# Patient Record
Sex: FEMALE | Race: WHITE | NOT HISPANIC OR LATINO | URBAN - METROPOLITAN AREA
[De-identification: names, ages, dates, MRNs, and addresses within clinical notes are randomized per-mention and may not be internally consistent; named-entity substitution may affect disease eponyms.]

---

## 2017-05-28 ENCOUNTER — EMERGENCY (EMERGENCY)
Facility: HOSPITAL | Age: 82
LOS: 1 days | Discharge: HOME | End: 2017-05-28
Attending: EMERGENCY MEDICINE | Admitting: EMERGENCY MEDICINE
Payer: MEDICARE

## 2017-05-28 VITALS
DIASTOLIC BLOOD PRESSURE: 82 MMHG | TEMPERATURE: 98 F | HEART RATE: 64 BPM | RESPIRATION RATE: 16 BRPM | OXYGEN SATURATION: 98 % | SYSTOLIC BLOOD PRESSURE: 145 MMHG

## 2017-05-28 VITALS
TEMPERATURE: 98 F | HEART RATE: 63 BPM | RESPIRATION RATE: 19 BRPM | SYSTOLIC BLOOD PRESSURE: 148 MMHG | OXYGEN SATURATION: 98 % | DIASTOLIC BLOOD PRESSURE: 90 MMHG

## 2017-05-28 DIAGNOSIS — R51 HEADACHE: ICD-10-CM

## 2017-05-28 DIAGNOSIS — S00.81XA ABRASION OF OTHER PART OF HEAD, INITIAL ENCOUNTER: ICD-10-CM

## 2017-05-28 DIAGNOSIS — Z23 ENCOUNTER FOR IMMUNIZATION: ICD-10-CM

## 2017-05-28 DIAGNOSIS — Z79.899 OTHER LONG TERM (CURRENT) DRUG THERAPY: ICD-10-CM

## 2017-05-28 DIAGNOSIS — S01.511A LACERATION WITHOUT FOREIGN BODY OF LIP, INITIAL ENCOUNTER: ICD-10-CM

## 2017-05-28 PROCEDURE — 70486 CT MAXILLOFACIAL W/O DYE: CPT | Mod: 26

## 2017-05-28 PROCEDURE — 70450 CT HEAD/BRAIN W/O DYE: CPT | Mod: 26

## 2017-05-28 PROCEDURE — 70450 CT HEAD/BRAIN W/O DYE: CPT | Mod: 26,77

## 2017-05-28 PROCEDURE — 99220: CPT

## 2017-05-28 RX ORDER — CARBIDOPA AND LEVODOPA 25; 100 MG/1; MG/1
0 TABLET ORAL
Qty: 0 | Refills: 0 | COMMUNITY

## 2017-05-28 RX ORDER — ESCITALOPRAM OXALATE 10 MG/1
0 TABLET, FILM COATED ORAL
Qty: 0 | Refills: 0 | COMMUNITY

## 2017-05-28 RX ORDER — TETANUS TOXOID, REDUCED DIPHTHERIA TOXOID AND ACELLULAR PERTUSSIS VACCINE, ADSORBED 5; 2.5; 8; 8; 2.5 [IU]/.5ML; [IU]/.5ML; UG/.5ML; UG/.5ML; UG/.5ML
0.5 SUSPENSION INTRAMUSCULAR ONCE
Qty: 0 | Refills: 0 | Status: DISCONTINUED | OUTPATIENT
Start: 2017-05-28 | End: 2017-05-28

## 2017-05-28 RX ORDER — TETANUS TOXOID, REDUCED DIPHTHERIA TOXOID AND ACELLULAR PERTUSSIS VACCINE, ADSORBED 5; 2.5; 8; 8; 2.5 [IU]/.5ML; [IU]/.5ML; UG/.5ML; UG/.5ML; UG/.5ML
0.5 SUSPENSION INTRAMUSCULAR ONCE
Qty: 0 | Refills: 0 | Status: COMPLETED | OUTPATIENT
Start: 2017-05-28 | End: 2017-05-28

## 2017-05-28 RX ADMIN — TETANUS TOXOID, REDUCED DIPHTHERIA TOXOID AND ACELLULAR PERTUSSIS VACCINE, ADSORBED 0.5 MILLILITER(S): 5; 2.5; 8; 8; 2.5 SUSPENSION INTRAMUSCULAR at 15:00

## 2017-05-28 NOTE — ED PROVIDER NOTE - NEUROLOGICAL, MLM
Alert and oriented, no focal deficits, no motor or sensory deficits. Alert and oriented, no focal deficits, no motor or sensory deficits.  Ambulatory with walker

## 2017-05-28 NOTE — ED PROVIDER NOTE - NS ED MD SCRIBE ATTENDING SCRIBE SECTIONS
HISTORY OF PRESENT ILLNESS/CONSULTATIONS/SHIFT CHANGE/RESULTS/HIV/REVIEW OF SYSTEMS/DISPOSITION/INTAKE ASSESSMENT/SCREENINGS/PAST MEDICAL/SURGICAL/SOCIAL HISTORY/PHYSICAL EXAM/PROGRESS NOTE/VITAL SIGNS( Pullset)

## 2017-05-28 NOTE — ED PROVIDER NOTE - PROGRESS NOTE DETAILS
CT brain with small < 1 cm focal hypodensity - possible traumatic hemorrhagic but no edema.  Patient without headache, dizziness, n/v.  No blood thinning medication use.  Resting comfortable.  Radiology recommending rescan in 6-12 hours.  Called NRS team and spoke with Dulce - pending recommendations NRS recommends re-scan in 6 hour from initial scan ~ 8 pm CT brain with small < 1 cm focal hypodensity - possible traumatic hemorrhagic but no edema.  Patient without headache, dizziness, n/v.  No blood thinning medication use.  Resting comfortable.  Radiology recommending rescan in 6-12 hours.  Called NRS team and spoke with Katie - pending recommendations

## 2017-05-28 NOTE — ED CDU PROVIDER NOTE - PROGRESS NOTE DETAILS
no change in symptoms or exam.  Pending repeat CT scan.  Signed out to Dr. Murdock patient's repeat ct is stable with no changes. patient given follow up instructions with her pmd and to be taken home via ambulance. ambulating with assistance and walker. and in no acute distress.

## 2017-05-28 NOTE — ED ADULT TRIAGE NOTE - CHIEF COMPLAINT QUOTE
[place] from train [place] s/p fall on escalator. Was ambulating with walker and foot of device got caught, fell face first. Presents with abrasions to face at nose and lip, states she does not take anticoagulant medications, denies LOC. Hx Parkinson's, tetanus status unk

## 2017-05-28 NOTE — ED PROVIDER NOTE - ENMT, MLM
Airway patent, Nasal mucosa clear. Mouth with normal mucosa. Throat has no vesicles, no oropharyngeal exudates and uvula is midline. Airway patent, Nasal mucosa clear. Mouth with normal mucosa. upper lip mucosal surface small laceration without active bleeding.

## 2017-05-28 NOTE — ED ADULT NURSE NOTE - OBJECTIVE STATEMENT
Pt reports she was on the escalator with her walker and foot of walker got caught, pt tripped and fell forward hitting her face on the ground. Pt denies LOC or taking any blood thinners. Pt with abrasion to chin and pain to mouth, no obvious dental injury. Pt also c/o some pain to right hip. Pt denies any other injury

## 2017-05-28 NOTE — ED PROVIDER NOTE - MEDICAL DECISION MAKING DETAILS
Mechanical fall with facial pain. no signs of significant facial trauma or injury, however based on age, head and maxillofacial CT ordered. no new gait abnormalities; at baseline pt is unsteady and uses a walker due to parkinsons.

## 2017-05-28 NOTE — ED CDU PROVIDER NOTE - DETAILS
observation services for serial neurological exams and repeat neuroimaging.  resting comfortably now.

## 2017-05-28 NOTE — ED CDU PROVIDER NOTE - MEDICAL DECISION MAKING DETAILS
Observed in the ED x 6 hours pending repeat CT brain.  NO change in symptoms or in neurological status.  Comfortable appearing.  Walks steadily with walker and has close friend next door that helps her out.

## 2017-05-28 NOTE — ED PROVIDER NOTE - OBJECTIVE STATEMENT
81 yo female with history of parkinson's disease, walks with walker presents to ED after mechanical fall on escalator, hitting nose and face today. No LOC, no extremity/back/abdominal pain, no vomiting.

## 2017-05-28 NOTE — ED PROVIDER NOTE - NEURO NEGATIVE STATEMENT, MLM
no loss of consciousness, no gait abnormality, no headache, no sensory deficits, and no weakness. no loss of consciousness, no headache, no sensory deficits, and no weakness.

## 2017-08-14 ENCOUNTER — EMERGENCY (EMERGENCY)
Facility: HOSPITAL | Age: 82
LOS: 1 days | Discharge: HOME | End: 2017-08-14
Attending: EMERGENCY MEDICINE | Admitting: EMERGENCY MEDICINE
Payer: MEDICARE

## 2017-08-14 VITALS
TEMPERATURE: 98 F | RESPIRATION RATE: 18 BRPM | SYSTOLIC BLOOD PRESSURE: 159 MMHG | OXYGEN SATURATION: 98 % | HEART RATE: 74 BPM | DIASTOLIC BLOOD PRESSURE: 93 MMHG

## 2017-08-14 VITALS
HEART RATE: 62 BPM | SYSTOLIC BLOOD PRESSURE: 178 MMHG | RESPIRATION RATE: 20 BRPM | OXYGEN SATURATION: 99 % | TEMPERATURE: 98 F | DIASTOLIC BLOOD PRESSURE: 98 MMHG

## 2017-08-14 PROCEDURE — 99284 EMERGENCY DEPT VISIT MOD MDM: CPT | Mod: 25

## 2017-08-14 PROCEDURE — 70450 CT HEAD/BRAIN W/O DYE: CPT | Mod: 26

## 2017-08-14 PROCEDURE — 93010 ELECTROCARDIOGRAM REPORT: CPT

## 2017-08-14 NOTE — ED ADULT NURSE NOTE - OBJECTIVE STATEMENT
apx 0400 woke up to use restroom, upon standing became dizzy and fell striking face on the floor (no deformity noted) and hitting left arm on the floor as well (2 nickel sized skin tears noted) n s/s of distress, awaiting MD otero

## 2017-08-14 NOTE — ED PROVIDER NOTE - SKIN, MLM
Skin normal color for race, warm, dry and intact. No evidence of rash. Skin normal color for race, warm, dry. No evidence of rash. Skin tear 1.5cm by 1.5cm and 1.5cm by 1.5cm to left dorsal forearm, no swelling.

## 2017-08-14 NOTE — ED PROVIDER NOTE - OBJECTIVE STATEMENT
82y F with h/o aneurysm, parkinson's dz, vertigo presents s/p fall after feeling dizzy today at 0400. No LOC. Unknown head trauma. Denies HA, fever, n/v. + abrasion to left arm. Pt was sent from [facility] for further eval. Last TDAP unknown.

## 2017-08-14 NOTE — ED ADULT TRIAGE NOTE - CHIEF COMPLAINT QUOTE
Pt states she has a hx of dizziness/ falls, felt dizzy this morning at 4am and fell onto her head/ forehead. Denies pain or current dizziness but was sent from urgent care because they said due to her age she should have a CT. Pt denies LOC and not on blood thinners.

## 2017-08-14 NOTE — ED PROVIDER NOTE - MEDICAL DECISION MAKING DETAILS
Plan for CT head and Vaseline gauze dressings to wound. Plan for CT head and Vaseline gauze dressings to wound. Detailed wound care instruction provided

## 2017-08-18 DIAGNOSIS — S51.812A LACERATION WITHOUT FOREIGN BODY OF LEFT FOREARM, INITIAL ENCOUNTER: ICD-10-CM

## 2017-08-18 DIAGNOSIS — Z79.899 OTHER LONG TERM (CURRENT) DRUG THERAPY: ICD-10-CM

## 2017-08-18 DIAGNOSIS — S09.90XA UNSPECIFIED INJURY OF HEAD, INITIAL ENCOUNTER: ICD-10-CM

## 2017-12-09 ENCOUNTER — EMERGENCY (EMERGENCY)
Facility: HOSPITAL | Age: 82
LOS: 1 days | Discharge: HOME | End: 2017-12-09
Attending: EMERGENCY MEDICINE | Admitting: EMERGENCY MEDICINE
Payer: MEDICARE

## 2017-12-09 VITALS
RESPIRATION RATE: 20 BRPM | TEMPERATURE: 98 F | OXYGEN SATURATION: 98 % | SYSTOLIC BLOOD PRESSURE: 145 MMHG | DIASTOLIC BLOOD PRESSURE: 90 MMHG | HEART RATE: 68 BPM

## 2017-12-09 VITALS
RESPIRATION RATE: 18 BRPM | HEART RATE: 65 BPM | SYSTOLIC BLOOD PRESSURE: 162 MMHG | DIASTOLIC BLOOD PRESSURE: 77 MMHG | OXYGEN SATURATION: 97 %

## 2017-12-09 LAB
ALBUMIN SERPL ELPH-MCNC: 3.9 G/DL — SIGNIFICANT CHANGE UP (ref 3.4–5)
ALP SERPL-CCNC: 48 U/L — SIGNIFICANT CHANGE UP (ref 40–120)
ALT FLD-CCNC: 21 U/L — SIGNIFICANT CHANGE UP (ref 12–42)
ANION GAP SERPL CALC-SCNC: 6 MMOL/L — LOW (ref 9–16)
APPEARANCE UR: CLEAR — SIGNIFICANT CHANGE UP
AST SERPL-CCNC: 28 U/L — SIGNIFICANT CHANGE UP (ref 15–37)
BACTERIA # UR AUTO: (no result) /HPF
BASOPHILS NFR BLD AUTO: 1 % — SIGNIFICANT CHANGE UP (ref 0–2)
BILIRUB SERPL-MCNC: 0.4 MG/DL — SIGNIFICANT CHANGE UP (ref 0.2–1.2)
BILIRUB UR-MCNC: NEGATIVE — SIGNIFICANT CHANGE UP
BUN SERPL-MCNC: 23 MG/DL — SIGNIFICANT CHANGE UP (ref 7–23)
CALCIUM SERPL-MCNC: 9.4 MG/DL — SIGNIFICANT CHANGE UP (ref 8.5–10.5)
CHLORIDE SERPL-SCNC: 107 MMOL/L — SIGNIFICANT CHANGE UP (ref 96–108)
CK MB BLD-MCNC: 4.1 % — HIGH
CK MB CFR SERPL CALC: 7.7 NG/ML — HIGH (ref 0.5–3.6)
CK SERPL-CCNC: 188 U/L — SIGNIFICANT CHANGE UP (ref 26–192)
CO2 SERPL-SCNC: 29 MMOL/L — SIGNIFICANT CHANGE UP (ref 22–31)
COLOR SPEC: YELLOW — SIGNIFICANT CHANGE UP
CREAT SERPL-MCNC: 0.83 MG/DL — SIGNIFICANT CHANGE UP (ref 0.5–1.3)
DIFF PNL FLD: NEGATIVE — SIGNIFICANT CHANGE UP
EOSINOPHIL NFR BLD AUTO: 1.4 % — SIGNIFICANT CHANGE UP (ref 0–6)
EPI CELLS # UR: SIGNIFICANT CHANGE UP /HPF
GLUCOSE SERPL-MCNC: 101 MG/DL — HIGH (ref 70–99)
GLUCOSE UR QL: NEGATIVE — SIGNIFICANT CHANGE UP
HCT VFR BLD CALC: 35.3 % — SIGNIFICANT CHANGE UP (ref 34.5–45)
HGB BLD-MCNC: 11.4 G/DL — LOW (ref 11.5–15.5)
HYALINE CASTS # UR AUTO: (no result) /LPF
IMM GRANULOCYTES NFR BLD AUTO: 0.2 % — SIGNIFICANT CHANGE UP (ref 0–1.5)
KETONES UR-MCNC: NEGATIVE — SIGNIFICANT CHANGE UP
LACTATE SERPL-SCNC: 0.6 MMOL/L — SIGNIFICANT CHANGE UP (ref 0.4–2)
LEUKOCYTE ESTERASE UR-ACNC: (no result)
LIDOCAIN IGE QN: 429 U/L — HIGH (ref 73–393)
LYMPHOCYTES # BLD AUTO: 25.5 % — SIGNIFICANT CHANGE UP (ref 13–44)
MCHC RBC-ENTMCNC: 29.5 PG — SIGNIFICANT CHANGE UP (ref 27–34)
MCHC RBC-ENTMCNC: 32.3 G/DL — SIGNIFICANT CHANGE UP (ref 32–36)
MCV RBC AUTO: 91.2 FL — SIGNIFICANT CHANGE UP (ref 80–100)
MONOCYTES NFR BLD AUTO: 9.5 % — SIGNIFICANT CHANGE UP (ref 2–14)
NEUTROPHILS NFR BLD AUTO: 62.4 % — SIGNIFICANT CHANGE UP (ref 43–77)
NITRITE UR-MCNC: NEGATIVE — SIGNIFICANT CHANGE UP
PH UR: 6 — SIGNIFICANT CHANGE UP (ref 5–8)
PLATELET # BLD AUTO: 313 K/UL — SIGNIFICANT CHANGE UP (ref 150–400)
POTASSIUM SERPL-MCNC: 3.6 MMOL/L — SIGNIFICANT CHANGE UP (ref 3.5–5.3)
POTASSIUM SERPL-SCNC: 3.6 MMOL/L — SIGNIFICANT CHANGE UP (ref 3.5–5.3)
PROT SERPL-MCNC: 7.1 G/DL — SIGNIFICANT CHANGE UP (ref 6.4–8.2)
PROT UR-MCNC: 30 MG/DL
RBC # BLD: 3.87 M/UL — SIGNIFICANT CHANGE UP (ref 3.8–5.2)
RBC # FLD: 13.4 % — SIGNIFICANT CHANGE UP (ref 10.3–16.9)
RBC CASTS # UR COMP ASSIST: < 5 /HPF — SIGNIFICANT CHANGE UP
SODIUM SERPL-SCNC: 142 MMOL/L — SIGNIFICANT CHANGE UP (ref 132–145)
SP GR SPEC: 1.02 — SIGNIFICANT CHANGE UP (ref 1–1.03)
TROPONIN I SERPL-MCNC: <0.017 NG/ML — LOW (ref 0.02–0.06)
UROBILINOGEN FLD QL: 0.2 E.U./DL — SIGNIFICANT CHANGE UP
WBC # BLD: 5.1 K/UL — SIGNIFICANT CHANGE UP (ref 3.8–10.5)
WBC # FLD AUTO: 5.1 K/UL — SIGNIFICANT CHANGE UP (ref 3.8–10.5)
WBC UR QL: > 10 /HPF

## 2017-12-09 PROCEDURE — 71010: CPT | Mod: 26

## 2017-12-09 PROCEDURE — 99285 EMERGENCY DEPT VISIT HI MDM: CPT

## 2017-12-09 PROCEDURE — 70450 CT HEAD/BRAIN W/O DYE: CPT | Mod: 26

## 2017-12-09 RX ORDER — CEFTRIAXONE 500 MG/1
1 INJECTION, POWDER, FOR SOLUTION INTRAMUSCULAR; INTRAVENOUS ONCE
Qty: 0 | Refills: 0 | Status: COMPLETED | OUTPATIENT
Start: 2017-12-09 | End: 2017-12-09

## 2017-12-09 RX ORDER — SODIUM CHLORIDE 9 MG/ML
1000 INJECTION INTRAMUSCULAR; INTRAVENOUS; SUBCUTANEOUS ONCE
Qty: 0 | Refills: 0 | Status: COMPLETED | OUTPATIENT
Start: 2017-12-09 | End: 2017-12-09

## 2017-12-09 RX ADMIN — SODIUM CHLORIDE 1000 MILLILITER(S): 9 INJECTION INTRAMUSCULAR; INTRAVENOUS; SUBCUTANEOUS at 23:09

## 2017-12-09 RX ADMIN — CEFTRIAXONE 100 GRAM(S): 500 INJECTION, POWDER, FOR SOLUTION INTRAMUSCULAR; INTRAVENOUS at 23:09

## 2017-12-09 NOTE — ED PROVIDER NOTE - PROGRESS NOTE DETAILS
feels better after fluids, IV abx dose for possibility of UTI. Pt had normal labs, EKG, CXR and HCT. UA suggestive of UTI, gave one dose of IV abx. Planned to keep pt until AM to speak to family and discuss need for further care at home needed for pt but pt decided to leave ED since she was feeling better and was able to walk on her own without assistance. Paper script for ceftin provided during downtime.

## 2017-12-09 NOTE — ED ADULT TRIAGE NOTE - CHIEF COMPLAINT QUOTE
BIBA with complaints of left leg weakness. Patient is alert and oriented x 3, was ambulatory on scene. Picked up from home, private residence. Patient has hx parkinson's on rytary ER 36.25, and htn.

## 2017-12-09 NOTE — ED PROVIDER NOTE - OBJECTIVE STATEMENT
83 y/o female pt, hx of parkinsons and HTN, scoliosis, called 911 after having worsening trouble with getting up from her chair. Pt states that she has been having worsening trouble with getting up lately and when she finally gets up she has to walk very cautiously since she feels unsteady. Also states she feels confused at times. Denies chest pain, palpitations, HA, neck pain, abd pain, nausea, vomiting, fever, chills. Weakness more so on lower extremities. Lives alone, closest family member in [place].

## 2017-12-09 NOTE — ED PROVIDER NOTE - CARE PLAN
Principal Discharge DX:	Weakness Principal Discharge DX:	Weakness  Secondary Diagnosis:	UTI (urinary tract infection)

## 2017-12-09 NOTE — ED PROVIDER NOTE - CONSTITUTIONAL, MLM
normal... chronically ill appearing, well nourished, awake, alert, oriented to person, place, time/situation and in no apparent distress.

## 2017-12-09 NOTE — ED PROVIDER NOTE - MEDICAL DECISION MAKING DETAILS
elderly pt w non specific weakness, seems that her functional status has been declining. plan: work up including labs, look for signs of infection, lyte imbalances, HCT, CXR, and observe

## 2017-12-11 LAB
CULTURE RESULTS: SIGNIFICANT CHANGE UP
SPECIMEN SOURCE: SIGNIFICANT CHANGE UP

## 2017-12-13 DIAGNOSIS — R53.1 WEAKNESS: ICD-10-CM

## 2017-12-13 DIAGNOSIS — I10 ESSENTIAL (PRIMARY) HYPERTENSION: ICD-10-CM

## 2017-12-13 DIAGNOSIS — N39.0 URINARY TRACT INFECTION, SITE NOT SPECIFIED: ICD-10-CM

## 2017-12-13 DIAGNOSIS — Z79.899 OTHER LONG TERM (CURRENT) DRUG THERAPY: ICD-10-CM

## 2017-12-13 LAB
-  CANDIDA ALBICANS: SIGNIFICANT CHANGE UP
-  CANDIDA GLABRATA: SIGNIFICANT CHANGE UP
-  CANDIDA KRUSEI: SIGNIFICANT CHANGE UP
-  CANDIDA PARAPSILOSIS: SIGNIFICANT CHANGE UP
-  CANDIDA TROPICALIS: SIGNIFICANT CHANGE UP
-  COAGULASE NEGATIVE STAPHYLOCOCCUS: SIGNIFICANT CHANGE UP
-  K. PNEUMONIAE GROUP: SIGNIFICANT CHANGE UP
-  KPC RESISTANCE GENE: SIGNIFICANT CHANGE UP
-  STREPTOCOCCUS SP. (NOT GRP A, B OR S PNEUMONIAE): SIGNIFICANT CHANGE UP
A BAUMANNII DNA SPEC QL NAA+PROBE: SIGNIFICANT CHANGE UP
E CLOAC COMP DNA BLD POS QL NAA+PROBE: SIGNIFICANT CHANGE UP
E COLI DNA BLD POS QL NAA+NON-PROBE: SIGNIFICANT CHANGE UP
ENTEROCOC DNA BLD POS QL NAA+NON-PROBE: SIGNIFICANT CHANGE UP
ENTEROCOC DNA BLD POS QL NAA+NON-PROBE: SIGNIFICANT CHANGE UP
GP B STREP DNA BLD POS QL NAA+NON-PROBE: SIGNIFICANT CHANGE UP
GRAM STN FLD: SIGNIFICANT CHANGE UP
HAEM INFLU DNA BLD POS QL NAA+NON-PROBE: SIGNIFICANT CHANGE UP
K OXYTOCA DNA BLD POS QL NAA+NON-PROBE: SIGNIFICANT CHANGE UP
L MONOCYTOG DNA BLD POS QL NAA+NON-PROBE: SIGNIFICANT CHANGE UP
METHOD TYPE: SIGNIFICANT CHANGE UP
MRSA SPEC QL CULT: SIGNIFICANT CHANGE UP
MSSA DNA SPEC QL NAA+PROBE: SIGNIFICANT CHANGE UP
N MEN ISLT CULT: SIGNIFICANT CHANGE UP
P AERUGINOSA DNA BLD POS NAA+NON-PROBE: SIGNIFICANT CHANGE UP
PROTEUS SP DNA BLD POS QL NAA+NON-PROBE: SIGNIFICANT CHANGE UP
S MARCESCENS DNA BLD POS NAA+NON-PROBE: SIGNIFICANT CHANGE UP
S PNEUM DNA BLD POS QL NAA+NON-PROBE: SIGNIFICANT CHANGE UP
S PYO DNA BLD POS QL NAA+NON-PROBE: SIGNIFICANT CHANGE UP

## 2017-12-14 LAB
CULTURE RESULTS: SIGNIFICANT CHANGE UP
ORGANISM # SPEC MICROSCOPIC CNT: SIGNIFICANT CHANGE UP
ORGANISM # SPEC MICROSCOPIC CNT: SIGNIFICANT CHANGE UP
SPECIMEN SOURCE: SIGNIFICANT CHANGE UP

## 2017-12-15 LAB
CULTURE RESULTS: SIGNIFICANT CHANGE UP
SPECIMEN SOURCE: SIGNIFICANT CHANGE UP

## 2018-03-01 ENCOUNTER — EMERGENCY (EMERGENCY)
Facility: HOSPITAL | Age: 83
LOS: 1 days | Discharge: HOME | End: 2018-03-01
Admitting: EMERGENCY MEDICINE
Payer: MEDICARE

## 2018-03-01 VITALS
DIASTOLIC BLOOD PRESSURE: 103 MMHG | RESPIRATION RATE: 16 BRPM | HEART RATE: 73 BPM | OXYGEN SATURATION: 99 % | SYSTOLIC BLOOD PRESSURE: 170 MMHG | TEMPERATURE: 98 F

## 2018-03-01 VITALS
HEART RATE: 68 BPM | RESPIRATION RATE: 16 BRPM | DIASTOLIC BLOOD PRESSURE: 92 MMHG | OXYGEN SATURATION: 99 % | SYSTOLIC BLOOD PRESSURE: 161 MMHG | TEMPERATURE: 99 F

## 2018-03-01 DIAGNOSIS — I11.0 HYPERTENSIVE HEART DISEASE WITH HEART FAILURE: ICD-10-CM

## 2018-03-01 DIAGNOSIS — Z79.899 OTHER LONG TERM (CURRENT) DRUG THERAPY: ICD-10-CM

## 2018-03-01 DIAGNOSIS — R10.9 UNSPECIFIED ABDOMINAL PAIN: ICD-10-CM

## 2018-03-01 DIAGNOSIS — K59.00 CONSTIPATION, UNSPECIFIED: ICD-10-CM

## 2018-03-01 PROCEDURE — 99283 EMERGENCY DEPT VISIT LOW MDM: CPT

## 2018-03-01 RX ORDER — MULTIVIT WITH MIN/MFOLATE/K2 340-15/3 G
350 POWDER (GRAM) ORAL ONCE
Qty: 0 | Refills: 0 | Status: COMPLETED | OUTPATIENT
Start: 2018-03-01 | End: 2018-03-01

## 2018-03-01 RX ADMIN — Medication 350 MILLILITER(S): at 19:42

## 2018-03-01 NOTE — ED PROVIDER NOTE - MEDICAL DECISION MAKING DETAILS
pt with constipation, no hard stool impaction.  only small BM with enema in ED, and I offered to give mag citrate in ed however pt would like to take this home and drink at home.  pt instructed to return with any abdominal pain, fevers, vomiting or increased/worrisome symptoms

## 2018-03-01 NOTE — ED PROVIDER NOTE - OBJECTIVE STATEMENT
81 y/o F with h/o Parkinson's, aneurysm, mild HTN, presents with constipation for 1 week. She denies any h/o abd surgery or h/o SVO. She presented to her PMD yesterday and reports she did small disimpaction but no other stool/BMs afterwards. Denies any abd pain, nausea, vomiting.

## 2018-03-01 NOTE — ED PROVIDER NOTE - GENITOURINARY, MLM
No impacted stool in the vault. No hard stool on digital rectal exam. However, soft, brown stool noted in the vault.

## 2018-03-01 NOTE — ED PROVIDER NOTE - CONSTITUTIONAL, MLM
normal... frail but well appearing, well nourished, awake, alert, oriented to person, place, time/situation and in no apparent distress.

## 2018-04-09 ENCOUNTER — EMERGENCY (EMERGENCY)
Facility: HOSPITAL | Age: 83
LOS: 1 days | Discharge: HOME | End: 2018-04-09
Attending: EMERGENCY MEDICINE | Admitting: EMERGENCY MEDICINE
Payer: MEDICARE

## 2018-04-09 VITALS
RESPIRATION RATE: 18 BRPM | TEMPERATURE: 98 F | DIASTOLIC BLOOD PRESSURE: 123 MMHG | HEART RATE: 71 BPM | OXYGEN SATURATION: 100 % | SYSTOLIC BLOOD PRESSURE: 182 MMHG

## 2018-04-09 DIAGNOSIS — Z79.899 OTHER LONG TERM (CURRENT) DRUG THERAPY: ICD-10-CM

## 2018-04-09 DIAGNOSIS — R55 SYNCOPE AND COLLAPSE: ICD-10-CM

## 2018-04-09 DIAGNOSIS — Z87.891 PERSONAL HISTORY OF NICOTINE DEPENDENCE: ICD-10-CM

## 2018-04-09 DIAGNOSIS — I10 ESSENTIAL (PRIMARY) HYPERTENSION: ICD-10-CM

## 2018-04-09 DIAGNOSIS — R07.89 OTHER CHEST PAIN: ICD-10-CM

## 2018-04-09 DIAGNOSIS — R07.9 CHEST PAIN, UNSPECIFIED: ICD-10-CM

## 2018-04-09 LAB
ALBUMIN SERPL ELPH-MCNC: 3.4 G/DL — SIGNIFICANT CHANGE UP (ref 3.4–5)
ALP SERPL-CCNC: 50 U/L — SIGNIFICANT CHANGE UP (ref 40–120)
ALT FLD-CCNC: 32 U/L — SIGNIFICANT CHANGE UP (ref 12–42)
ANION GAP SERPL CALC-SCNC: 11 MMOL/L — SIGNIFICANT CHANGE UP (ref 9–16)
ANION GAP SERPL CALC-SCNC: 6 MMOL/L — LOW (ref 9–16)
APTT BLD: 16.6 SEC — LOW (ref 27.5–36.5)
AST SERPL-CCNC: SIGNIFICANT CHANGE UP U/L (ref 15–37)
BASOPHILS NFR BLD AUTO: 1 % — SIGNIFICANT CHANGE UP (ref 0–2)
BILIRUB SERPL-MCNC: 0.5 MG/DL — SIGNIFICANT CHANGE UP (ref 0.2–1.2)
BUN SERPL-MCNC: 23 MG/DL — SIGNIFICANT CHANGE UP (ref 7–23)
BUN SERPL-MCNC: 29 MG/DL — HIGH (ref 7–23)
CALCIUM SERPL-MCNC: 10.2 MG/DL — SIGNIFICANT CHANGE UP (ref 8.5–10.5)
CALCIUM SERPL-MCNC: 9.6 MG/DL — SIGNIFICANT CHANGE UP (ref 8.5–10.5)
CHLORIDE SERPL-SCNC: 105 MMOL/L — SIGNIFICANT CHANGE UP (ref 96–108)
CHLORIDE SERPL-SCNC: 105 MMOL/L — SIGNIFICANT CHANGE UP (ref 96–108)
CK MB BLD-MCNC: 1.27 % — SIGNIFICANT CHANGE UP
CK MB CFR SERPL CALC: 2 NG/ML — SIGNIFICANT CHANGE UP (ref 0.5–3.6)
CK SERPL-CCNC: 158 U/L — SIGNIFICANT CHANGE UP (ref 26–192)
CO2 SERPL-SCNC: 26 MMOL/L — SIGNIFICANT CHANGE UP (ref 22–31)
CO2 SERPL-SCNC: 26 MMOL/L — SIGNIFICANT CHANGE UP (ref 22–31)
CREAT SERPL-MCNC: 0.63 MG/DL — SIGNIFICANT CHANGE UP (ref 0.5–1.3)
CREAT SERPL-MCNC: 0.73 MG/DL — SIGNIFICANT CHANGE UP (ref 0.5–1.3)
EOSINOPHIL NFR BLD AUTO: 0.7 % — SIGNIFICANT CHANGE UP (ref 0–6)
GLUCOSE SERPL-MCNC: 112 MG/DL — HIGH (ref 70–99)
GLUCOSE SERPL-MCNC: 120 MG/DL — HIGH (ref 70–99)
HCT VFR BLD CALC: 37.1 % — SIGNIFICANT CHANGE UP (ref 34.5–45)
HCT VFR BLD CALC: 38.6 % — SIGNIFICANT CHANGE UP (ref 34.5–45)
HGB BLD-MCNC: 11.8 G/DL — SIGNIFICANT CHANGE UP (ref 11.5–15.5)
HGB BLD-MCNC: 12.8 G/DL — SIGNIFICANT CHANGE UP (ref 11.5–15.5)
IMM GRANULOCYTES NFR BLD AUTO: 0.3 % — SIGNIFICANT CHANGE UP (ref 0–1.5)
INR BLD: 0.94 — SIGNIFICANT CHANGE UP (ref 0.88–1.16)
LIDOCAIN IGE QN: 166 U/L — SIGNIFICANT CHANGE UP (ref 73–393)
LYMPHOCYTES # BLD AUTO: 17.2 % — SIGNIFICANT CHANGE UP (ref 13–44)
MCHC RBC-ENTMCNC: 30 PG — SIGNIFICANT CHANGE UP (ref 27–34)
MCHC RBC-ENTMCNC: 30.6 PG — SIGNIFICANT CHANGE UP (ref 27–34)
MCHC RBC-ENTMCNC: 31.8 G/DL — LOW (ref 32–36)
MCHC RBC-ENTMCNC: 33.2 G/DL — SIGNIFICANT CHANGE UP (ref 32–36)
MCV RBC AUTO: 92.3 FL — SIGNIFICANT CHANGE UP (ref 80–100)
MCV RBC AUTO: 94.4 FL — SIGNIFICANT CHANGE UP (ref 80–100)
MONOCYTES NFR BLD AUTO: 8.2 % — SIGNIFICANT CHANGE UP (ref 2–14)
NEUTROPHILS NFR BLD AUTO: 72.6 % — SIGNIFICANT CHANGE UP (ref 43–77)
NT-PROBNP SERPL-SCNC: 102 PG/ML — SIGNIFICANT CHANGE UP
PLATELET # BLD AUTO: 322 K/UL — SIGNIFICANT CHANGE UP (ref 150–400)
PLATELET # BLD AUTO: 60 K/UL — LOW (ref 150–400)
POTASSIUM SERPL-MCNC: 4 MMOL/L — SIGNIFICANT CHANGE UP (ref 3.5–5.3)
POTASSIUM SERPL-MCNC: 6 MMOL/L — HIGH (ref 3.5–5.3)
POTASSIUM SERPL-SCNC: 4 MMOL/L — SIGNIFICANT CHANGE UP (ref 3.5–5.3)
POTASSIUM SERPL-SCNC: 6 MMOL/L — HIGH (ref 3.5–5.3)
PROT SERPL-MCNC: 7.1 G/DL — SIGNIFICANT CHANGE UP (ref 6.4–8.2)
PROTHROM AB SERPL-ACNC: 10.3 SEC — SIGNIFICANT CHANGE UP (ref 9.8–12.7)
RBC # BLD: 3.93 M/UL — SIGNIFICANT CHANGE UP (ref 3.8–5.2)
RBC # BLD: 4.18 M/UL — SIGNIFICANT CHANGE UP (ref 3.8–5.2)
RBC # FLD: 13.4 % — SIGNIFICANT CHANGE UP (ref 10.3–16.9)
RBC # FLD: 13.5 % — SIGNIFICANT CHANGE UP (ref 10.3–16.9)
SODIUM SERPL-SCNC: 137 MMOL/L — SIGNIFICANT CHANGE UP (ref 132–145)
SODIUM SERPL-SCNC: 142 MMOL/L — SIGNIFICANT CHANGE UP (ref 132–145)
TROPONIN I SERPL-MCNC: <0.017 NG/ML — LOW (ref 0.02–0.06)
TROPONIN I SERPL-MCNC: <0.017 NG/ML — LOW (ref 0.02–0.06)
WBC # BLD: 5.9 K/UL — SIGNIFICANT CHANGE UP (ref 3.8–10.5)
WBC # BLD: 6 K/UL — SIGNIFICANT CHANGE UP (ref 3.8–10.5)
WBC # FLD AUTO: 5.9 K/UL — SIGNIFICANT CHANGE UP (ref 3.8–10.5)
WBC # FLD AUTO: 6 K/UL — SIGNIFICANT CHANGE UP (ref 3.8–10.5)

## 2018-04-09 PROCEDURE — 93010 ELECTROCARDIOGRAM REPORT: CPT | Mod: 76

## 2018-04-09 PROCEDURE — 99218: CPT | Mod: 25

## 2018-04-09 PROCEDURE — 71045 X-RAY EXAM CHEST 1 VIEW: CPT | Mod: 26

## 2018-04-09 RX ADMIN — Medication 5 MILLIGRAM(S): at 22:55

## 2018-04-09 NOTE — ED CDU PROVIDER INITIAL DAY NOTE - PROGRESS NOTE DETAILS
Improved status, normal 2 trops and EKG, spoke to pt's sister Brenda and states that pt's night HHA is not available to be at home tonight with her Will delay DC until AM.

## 2018-04-09 NOTE — ED CDU PROVIDER SUBSEQUENT DAY NOTE - PROGRESS NOTE DETAILS
Normal work up, will hold til am for safe discharge. patient ambulated in ED, tolerating POs, well appearing, with no medical complaints. awaiting to call sister rehan for discharge arrangements this morning. Spoke to Henna, breanne , who to rich who is day hha, contact info  She will come to [facility] this morning. she was with patient in the evening, and is aware she is being discharged. She will likely take a cab home with patient.

## 2018-04-09 NOTE — ED PROVIDER NOTE - DIAGNOSTIC INTERPRETATION
Interpreted by ED physician  Chest x-ray, 1 views  Lungs clear, heart shadow normal, bony structures normal, no free air under diaphragm, no PTX

## 2018-04-09 NOTE — ED PROVIDER NOTE - CHPI ED SYMPTOMS NEG
no chills/no abdominal pain/no nausea/no shortness of breath/no diaphoresis/no fever/no vomiting/no cough

## 2018-04-09 NOTE — ED CDU PROVIDER INITIAL DAY NOTE - DETAILS
Pt w episode of chest pain, already resolved, CXR normal, first troponin normal. Will Obs to assess for development of new symptoms, and for repeat trop/EKG

## 2018-04-09 NOTE — ED ADULT TRIAGE NOTE - CHIEF COMPLAINT QUOTE
Patient comes in for chest discomfort for 1-45mins. was given Aspirin 162mg via EMS, which relieved the pain. patient breathing comfortable. and denies active chest pain. states feels better. Patient also accompanied by health aid.

## 2018-04-09 NOTE — ED CDU PROVIDER SUBSEQUENT DAY NOTE - MEDICAL DECISION MAKING DETAILS
Chest pain work up done In ED, normal 2 trops, normal 2 ekgs. Will hold til am for safe discharge since HHA gets home in AM.

## 2018-04-09 NOTE — ED PROVIDER NOTE - MEDICAL DECISION MAKING DETAILS
R/o MI. Blood work, CXR, cardiac monitor R/o ACS due to chest pain. EKG nonischemic at this time. Labs, CXR, cardiac monitor ordered.

## 2018-04-09 NOTE — ED PROVIDER NOTE - OBJECTIVE STATEMENT
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 CP is diffuse, radiating up right side of neck to right ear with associated near syncope. Pain began 40 minutes PTA and was pressure-like in quality. She was sitting in a chair at home when symptoms began. Pt walked to a nursing unit at her apartment complex when symptoms began and EMS was called Pt given 162mg ASA by EMS in field. Symptoms subsided after 30 minutes. Prior to onset of symptoms, she had physical therapy. No CP or SOB at the time, but notes feeling "unwell". Pt is a former smoker. No hx of MI, asthma, or COPD. Denies f/c, cough, SOB, diaphoresis, abd pain, n/v/d, urinary sx.

## 2018-04-10 VITALS — SYSTOLIC BLOOD PRESSURE: 129 MMHG | DIASTOLIC BLOOD PRESSURE: 80 MMHG | HEART RATE: 65 BPM

## 2018-04-10 PROCEDURE — 99217: CPT

## 2018-04-10 NOTE — ED CDU PROVIDER DISPOSITION NOTE - CLINICAL COURSE
Please see initial provider notes.
Pt S/P chest pain episode, resolved, EKG non ischemic x2, normal CXR, pt in no distress during ED visit. States she has had similar episodes in the past w normal work ups.

## 2018-04-10 NOTE — ED ADULT NURSE REASSESSMENT NOTE - NS ED NURSE REASSESS COMMENT FT1
patient having breakfast
patient having breakfast.
pt resting in bed. Diaper and sheets changed. Pt advised that she will spend the night. Enalapril given as prescribed.
patient walking around in the ER with walker. Awaiting for health aid. Feeling better and ready to be discharged.
received pt. from [facility]. pt. resting on hospital stretcher, no complaints. breakfast will be ordered for patient

## 2019-02-05 NOTE — ED PROVIDER NOTE - CPE EDP NEURO NORM
----- Message from Pedro Pablo Chavez MD sent at 1/14/2019  7:52 AM EST -----  Please call pt and schedule her to come in for ov prior to next refill on medications. normal...

## 2019-03-26 ENCOUNTER — EMERGENCY (EMERGENCY)
Facility: HOSPITAL | Age: 84
LOS: 1 days | Discharge: HOME | End: 2019-03-26
Attending: EMERGENCY MEDICINE | Admitting: EMERGENCY MEDICINE
Payer: MEDICARE

## 2019-03-26 VITALS
SYSTOLIC BLOOD PRESSURE: 144 MMHG | OXYGEN SATURATION: 97 % | DIASTOLIC BLOOD PRESSURE: 91 MMHG | HEART RATE: 80 BPM | RESPIRATION RATE: 16 BRPM | TEMPERATURE: 98 F

## 2019-03-26 VITALS
TEMPERATURE: 98 F | SYSTOLIC BLOOD PRESSURE: 181 MMHG | OXYGEN SATURATION: 97 % | RESPIRATION RATE: 16 BRPM | DIASTOLIC BLOOD PRESSURE: 99 MMHG | HEART RATE: 73 BPM

## 2019-03-26 DIAGNOSIS — R53.1 WEAKNESS: ICD-10-CM

## 2019-03-26 DIAGNOSIS — Z79.899 OTHER LONG TERM (CURRENT) DRUG THERAPY: ICD-10-CM

## 2019-03-26 DIAGNOSIS — G20 PARKINSON'S DISEASE: ICD-10-CM

## 2019-03-26 PROCEDURE — 99284 EMERGENCY DEPT VISIT MOD MDM: CPT | Mod: 25

## 2019-03-26 PROCEDURE — 93010 ELECTROCARDIOGRAM REPORT: CPT

## 2019-03-26 NOTE — ED PROVIDER NOTE - OBJECTIVE STATEMENT
84 yo woman w a hisotry of parkinson's at home today feeling stiff unable to get up from chair called 911 as a means to help her get out up form the chair. pt has made a similar call to 911 for the same issue of needing to get out of chair in past. shaunna riley - who knows patient for 15 months present in the ER state that patient appear well at her base line. pt does not want testing done in ER "I just want ot go home and take a shower.     pt denies ha cp sob abd pain new weakness or sensory changes. no fever no n/v/d/

## 2019-03-26 NOTE — ED ADULT NURSE NOTE - CHIEF COMPLAINT QUOTE
Increased weakness today, patient unable to come out of bed today, h/o parkinson's, also reports falling today, mechanism unknown. AxOx2

## 2019-03-26 NOTE — ED ADULT TRIAGE NOTE - CHIEF COMPLAINT QUOTE
Increased weakness today, patient unable to come out of bed today, h/o parkinson's, also reports falling today, mechanism unknown Increased weakness today, patient unable to come out of bed today, h/o parkinson's, also reports falling today, mechanism unknown. AxOx2

## 2019-03-26 NOTE — ED ADULT NURSE NOTE - NSIMPLEMENTINTERV_GEN_ALL_ED
Implemented All Universal Safety Interventions:  [place] to call system. Call bell, personal items and telephone within reach. Instruct patient to call for assistance. Room bathroom lighting operational. Non-slip footwear when patient is off stretcher. Physically safe environment: no spills, clutter or unnecessary equipment. Stretcher in lowest position, wheels locked, appropriate side rails in place.

## 2019-03-26 NOTE — ED PROVIDER NOTE - NSFOLLOWUPINSTRUCTIONS_ED_ALL_ED_FT
1)  PLEASE FOLLOW-UP WITH YOUR PRIMARY CARE DOCTOR as benny     2)  BRING ALL PAPERWORK FROM TODAY'S EMERGENCY ROOM  VISIT TO YOUR FOLLOW-UP VISIT.  IF YOU DO NOT HAVE A PRIMARY CARE DOCTOR PLEASE REFER TO THE OFFICE/CLINIC INFORMATION GIVEN ABOVE.  YOU MAY ALSO CALL [phone] AND ASK FOR MS. ANTHONY BEINTEZ.  SHE CAN HELP YOU MAKE A FOLLOW-UP APPOINTMENT.  HER HOURS ARE 11AM-7PM MONDAY - FRIDAY.    3) PLEASE RETURN TO THE ER IMMEDIATELY OR CALL 911 FOR ANY HIGH FEVER, TROUBLE BREATHING, CHEST PAIN, WEAKNESS, VOMITING, SEVERE PAIN, OR ANY OTHER CONCERNS.

## 2019-03-26 NOTE — ED PROVIDER NOTE - CONSTITUTIONAL, MLM
normal... Well appearing, well nourished, awake, alert, oriented to person, place, time/situation and in no apparent distress. smiling

## 2019-03-26 NOTE — ED PROVIDER NOTE - NEUROLOGICAL, MLM
Alert and oriented, no focal deficits, speech is fluent and appropriate cooperative  pt is able to get up and out of bed w/ minimal assistance and ambulate holding hand without difficulty

## 2019-03-26 NOTE — ED PROVIDER NOTE - CLINICAL SUMMARY MEDICAL DECISION MAKING FREE TEXT BOX
Strict prompt return precautions to the Emergency Room discussed for any worsening or new symptoms. The patient verbalized understanding of these precautions and need to return. The patient tolerated PO fluids.  At the time of discharge from the Emergency Department, the patient is alert with fluent appropriate speech and ambulatory with assistance that is normal for patient.  difficulty.  A medical screening examination was performed and no emergency medical condition was identified.    dc home with shaunna riley     I have discussed the discharge plan with the patient. The patient agrees with the plan, as discussed.  The patient understands Emergency Department diagnosis is a preliminary diagnosis often based on limited information and that the patient must adhere to the follow-up plan as discussed.  The patient understands that if the symptoms worsen or if prescribed medications do not have the desired/planned effect that the patient may return to the Emergency Department at any time for further evaluation and treatment.

## 2019-03-27 PROBLEM — I72.9 ANEURYSM OF UNSPECIFIED SITE: Chronic | Status: ACTIVE | Noted: 2017-08-14

## 2019-03-27 PROBLEM — I10 ESSENTIAL (PRIMARY) HYPERTENSION: Chronic | Status: ACTIVE | Noted: 2018-03-01

## 2019-03-27 PROBLEM — G20 PARKINSON'S DISEASE: Chronic | Status: ACTIVE | Noted: 2017-05-28

## 2021-01-01 NOTE — ED PROVIDER NOTE - PROGRESS NOTE DETAILS
no normal work up so far, will obs for repeat EKG, troponin. Brenda ([facility]) [phone].

## 2022-09-21 NOTE — ED PROVIDER NOTE - DATA REVIEWED, MDM
Implemented All Fall Risk Interventions:  Brothers to call system. Call bell, personal items and telephone within reach. Instruct patient to call for assistance. Room bathroom lighting operational. Non-slip footwear when patient is off stretcher. Physically safe environment: no spills, clutter or unnecessary equipment. Stretcher in lowest position, wheels locked, appropriate side rails in place. Provide visual cue, wrist band, yellow gown, etc. Monitor gait and stability. Monitor for mental status changes and reorient to person, place, and time. Review medications for side effects contributing to fall risk. Reinforce activity limits and safety measures with patient and family.
vital signs/nurses notes

## 2024-02-15 NOTE — ED POST DISCHARGE NOTE - NEED FOR PATIENT CONTACT
No, appropriate care received in ED patient critically ill requiring medications with intensive monitoring, discussion with consultants, discussion with patient and family, interpretation of diagnostic studies.  I, Yakelin Branch, have personally seen and examined this patient. I have fully participated in the care of this patient. I have reviewed all pertinent clinical information, including history, physical exam, plan and the Resident's note and agree except as noted below.     89-year-old female with COPD not on home O2 no known CHF multiple family members with a viral illness last 2 to 3 days worsening shortness of breath generalized weakness.  On exam noted to be tachypneic with belly breathing speaking in full sentences diffuse wheeze and rhonchi.  No lower extremity edema.  Bedside sono without B-lines or pleural effusion.  Given DuoNebs steroids and IV magnesium with improvement but still mildly tachypneic.  Patient overall feels better but will admit due to degree of COPD exacerbation

## 2024-04-27 NOTE — ED CDU PROVIDER INITIAL DAY NOTE - MEDICAL DECISION MAKING DETAILS
Pt w episode of chest pain, already resolved, CXR normal, first troponin normal. Will Obs to assess for development of new symptoms, and for repeat trop/EKG ambulatory